# Patient Record
Sex: FEMALE | Race: WHITE | HISPANIC OR LATINO | URBAN - METROPOLITAN AREA
[De-identification: names, ages, dates, MRNs, and addresses within clinical notes are randomized per-mention and may not be internally consistent; named-entity substitution may affect disease eponyms.]

---

## 2019-11-06 ENCOUNTER — IMPORTED ENCOUNTER (OUTPATIENT)
Dept: URBAN - METROPOLITAN AREA CLINIC 31 | Facility: CLINIC | Age: 84
End: 2019-11-06

## 2019-11-06 PROBLEM — H16.232: Noted: 2019-11-14

## 2019-11-06 PROBLEM — H04.122: Noted: 2019-11-06

## 2019-11-06 PROBLEM — H16.212: Noted: 2019-11-14

## 2019-11-06 PROBLEM — H17.12: Noted: 2019-11-06

## 2019-11-06 PROCEDURE — 99204 OFFICE O/P NEW MOD 45 MIN: CPT

## 2019-11-06 PROCEDURE — 67875 CLOSURE OF EYELID BY SUTURE: CPT

## 2019-11-06 NOTE — PATIENT DISCUSSION
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 Tarsorrhaphy today. Upper and lower punctal plugs. Erythro ointment bid OS.  F/u 1 weekAl Yaneth's patient

## 2019-11-06 NOTE — PATIENT DISCUSSION
Chalazion single left lid. Use prescription dropsfir 1 week warm compresses tylenol as needed for discomfort no contact lenses for 1 week call office for any problems or concerns.

## 2019-11-06 NOTE — PATIENT DISCUSSION
1.  1.  Risks and benefits of punctal plugs reviewed with patient. Recommend punctal plugs and continued topical therapy2. 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 3.  Pernanent Plug placed in CAROL without complication4. Permanent Plug placed in LLL without complication5. Exposure keratitis OS: frequent ATTarsorrhaphy today. Upper and lower punctal plugs. Erythro ointment bid OS.  F/u 1 weekAl Yaneth's patient

## 2019-11-13 ENCOUNTER — IMPORTED ENCOUNTER (OUTPATIENT)
Dept: URBAN - METROPOLITAN AREA CLINIC 31 | Facility: CLINIC | Age: 84
End: 2019-11-13

## 2019-11-13 PROBLEM — H17.12: Noted: 2019-11-13

## 2019-11-13 PROBLEM — Z98.89: Noted: 2019-11-13

## 2019-11-13 PROCEDURE — 99024 POSTOP FOLLOW-UP VISIT: CPT

## 2019-11-13 NOTE — PATIENT DISCUSSION
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 F/u 2 weeks. If improved  - open tarsorrhaphy. Disussued that if again worse after tarsorfhaphy opened - will need a permanent tarsorrhaphyAl Yaneth's patient

## 2019-11-13 NOTE — PATIENT DISCUSSION
1.  Post Operative: Doing well po drops as instructed tears prn. Call with any problems. 2. 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 F/u 2 weeks. If improved  - open tarsorrhaphy. Disussued that if again worse after tarsorfhaphy opened - will need a permanent tarsorrhaphyAl Yaneth's patient regular

## 2019-11-26 ENCOUNTER — IMPORTED ENCOUNTER (OUTPATIENT)
Dept: URBAN - METROPOLITAN AREA CLINIC 31 | Facility: CLINIC | Age: 84
End: 2019-11-26

## 2019-11-26 PROBLEM — H16.212: Noted: 2019-11-26

## 2019-11-26 PROBLEM — Z98.89: Noted: 2019-11-26

## 2019-11-26 PROCEDURE — 99024 POSTOP FOLLOW-UP VISIT: CPT

## 2019-11-26 NOTE — PATIENT DISCUSSION
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 F/u with Dr Evelin Carmona in 1 week.  Punctal cautery in 1 month

## 2019-12-26 ENCOUNTER — IMPORTED ENCOUNTER (OUTPATIENT)
Dept: URBAN - METROPOLITAN AREA CLINIC 31 | Facility: CLINIC | Age: 84
End: 2019-12-26

## 2019-12-26 PROBLEM — Z98.89: Noted: 2019-12-26

## 2019-12-26 PROBLEM — H16.212: Noted: 2019-12-26

## 2019-12-26 PROCEDURE — 68760 CLOSE TEAR DUCT OPENING: CPT

## 2019-12-26 NOTE — PATIENT DISCUSSION
Thermal punctal occlusion LLLThe benefits risks alternatives and limitations of thermal punctal occlusion were discussed with the patient including the potential for re-opening of the punctum requiring a repeat procedure. Informed consent was obtained. After local infiltration of 2% xylocaine with [id number] epinephrine buffered in a 9:1 ratio with NaHCO3 the punctum was thermally cauterized to provide for closure of the punctal orifice. Slit lamp verification of complete closure was verified with any additional thermal treatment provided.

## 2019-12-26 NOTE — PATIENT DISCUSSION
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

## 2019-12-26 NOTE — PATIENT DISCUSSION
1.  Post Operative: Left lower cautery under local.Yaneth 3 months2. 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 3.  Thermal punctal occlusion LLLThe benefits risks alternatives and limitations of thermal punctal occlusion were discussed with the patient including the potential for re-opening of the punctum requiring a repeat procedure. Informed consent was obtained. After local infiltration of 2% xylocaine with [id number] epinephrine buffered in a 9:1 ratio with NaHCO3 the punctum was thermally cauterized to provide for closure of the punctal orifice. Slit lamp verification of complete closure was verified with any additional thermal treatment provided.

## 2022-04-02 ASSESSMENT — VISUAL ACUITY
OD_CC: 20/30
OD_CC: 20/30-2
OS_CC: 20/80-1
OS_CC: 20/70+1
OS_CC: 20/70-1
OS_CC: 20/400
OD_CC: 20/30-2
OD_CC: 20/25